# Patient Record
Sex: FEMALE | Race: WHITE | Employment: OTHER | URBAN - NONMETROPOLITAN AREA
[De-identification: names, ages, dates, MRNs, and addresses within clinical notes are randomized per-mention and may not be internally consistent; named-entity substitution may affect disease eponyms.]

---

## 2020-09-22 RX ORDER — POTASSIUM CHLORIDE 750 MG/1
10 CAPSULE, EXTENDED RELEASE ORAL 2 TIMES DAILY
COMMUNITY

## 2020-09-22 RX ORDER — DULOXETIN HYDROCHLORIDE 60 MG/1
60 CAPSULE, DELAYED RELEASE ORAL NIGHTLY
COMMUNITY

## 2020-09-22 RX ORDER — VALSARTAN 160 MG/1
160 TABLET ORAL DAILY
COMMUNITY

## 2020-09-22 RX ORDER — MULTIVIT WITH MINERALS/LUTEIN
1000 TABLET ORAL DAILY
COMMUNITY

## 2020-09-22 RX ORDER — LEVOTHYROXINE SODIUM 0.1 MG/1
100 TABLET ORAL DAILY
COMMUNITY

## 2020-09-22 RX ORDER — METOPROLOL TARTRATE 50 MG/1
50 TABLET, FILM COATED ORAL 2 TIMES DAILY
COMMUNITY

## 2020-09-22 RX ORDER — BUMETANIDE 0.5 MG/1
0.5 TABLET ORAL DAILY
COMMUNITY

## 2020-09-22 RX ORDER — ASCORBIC ACID 500 MG
1000 TABLET ORAL DAILY
COMMUNITY

## 2020-09-22 RX ORDER — SALSALATE 750 MG
750 TABLET ORAL 2 TIMES DAILY
COMMUNITY

## 2020-09-22 NOTE — PROGRESS NOTES
Patient instructed on the pre-operative, intra-operative, and post-operative process. Patient instructed on NPO status. Medication instructions and Pre operative instruction sheet reviewed over the phone. Instructed pt to stop taking all over the counter non prescription vitamins as of 9/26 and to take metoprolol and synthroid with a small sip of water prior to arriving to the hospital the day of surgery.

## 2020-09-23 ENCOUNTER — OFFICE VISIT (OUTPATIENT)
Dept: OBGYN CLINIC | Age: 70
End: 2020-09-23
Payer: COMMERCIAL

## 2020-09-23 VITALS
BODY MASS INDEX: 46.78 KG/M2 | SYSTOLIC BLOOD PRESSURE: 122 MMHG | HEIGHT: 63 IN | DIASTOLIC BLOOD PRESSURE: 70 MMHG | WEIGHT: 264 LBS

## 2020-09-23 PROCEDURE — 99213 OFFICE O/P EST LOW 20 MIN: CPT | Performed by: OBSTETRICS & GYNECOLOGY

## 2020-09-23 NOTE — PROGRESS NOTES
DATE OF VISIT:  9/23/20    PATIENT NAME:  Anahi Cartwright     YOB: 1950    REASON FOR VISIT:    Chief Complaint   Patient presents with    Pre-op Exam     Pt. here for f/u prior to surgery. Pt. bleeding postmenopausally. HISTORY OF PRESENT ILLNESS:  Pt with postmenopausal bleeding and thickened endometrium on usn; ut 7 cm with 12 mm lining      No LMP recorded. Patient is postmenopausal.  Vitals:    09/23/20 1620   Weight: 264 lb (119.7 kg)   Height: 5' 2.5\" (1.588 m)     Body mass index is 47.52 kg/m². Allergies   Allergen Reactions    Other      PNEUMONIA VACCINE    Sulfa Antibiotics Swelling     Current Outpatient Medications   Medication Sig Dispense Refill    valsartan (DIOVAN) 160 MG tablet Take 160 mg by mouth daily      metoprolol tartrate (LOPRESSOR) 50 MG tablet Take 50 mg by mouth 2 times daily      levothyroxine (SYNTHROID) 100 MCG tablet Take 100 mcg by mouth Daily      VITAMIN D PO Take by mouth      potassium chloride (MICRO-K) 10 MEQ extended release capsule Take 10 mEq by mouth 2 times daily      salsalate (DISALCID) 750 MG tablet Take 750 mg by mouth 2 times daily      bumetanide (BUMEX) 0.5 MG tablet Take 0.5 mg by mouth daily      DULoxetine (CYMBALTA) 60 MG extended release capsule Take 60 mg by mouth nightly      vitamin E 1000 units capsule Take 1,000 Units by mouth daily      vitamin C (ASCORBIC ACID) 500 MG tablet Take 1,000 mg by mouth daily      Multiple Vitamins-Minerals (MULTIVITAMIN ADULTS PO) Take by mouth daily      B Complex Vitamins (VITAMIN-B COMPLEX PO) Take by mouth daily      Zinc Sulfate (ZINC 15 PO) Take by mouth daily      Calcium Citrate-Vitamin D (CITRACAL + D PO) Take by mouth daily       No current facility-administered medications for this visit.       Social History     Socioeconomic History    Marital status:      Spouse name: Not on file    Number of children: Not on file    Years of education: Not on file    Highest education level: Not on file   Occupational History    Not on file   Social Needs    Financial resource strain: Not on file    Food insecurity     Worry: Not on file     Inability: Not on file    Transportation needs     Medical: Not on file     Non-medical: Not on file   Tobacco Use    Smoking status: Never Smoker    Smokeless tobacco: Never Used   Substance and Sexual Activity    Alcohol use: Not Currently    Drug use: Never    Sexual activity: Not on file   Lifestyle    Physical activity     Days per week: Not on file     Minutes per session: Not on file    Stress: Not on file   Relationships    Social connections     Talks on phone: Not on file     Gets together: Not on file     Attends [employer] service: Not on file     Active member of club or organization: Not on file     Attends meetings of clubs or organizations: Not on file     Relationship status: Not on file    Intimate partner violence     Fear of current or ex partner: Not on file     Emotionally abused: Not on file     Physically abused: Not on file     Forced sexual activity: Not on file   Other Topics Concern    Not on file   Social History Narrative    Not on file       REVIEW OF SYSTEMS:  Review of Systems   Constitutional: Negative for chills and fever. Genitourinary: Positive for vaginal bleeding. Negative for dysuria, pelvic pain and vaginal discharge. PHYSICAL EXAM:  Ht 5' 2.5\" (1.588 m)   Wt 264 lb (119.7 kg)   BMI 47.52 kg/m²   Physical Exam  Constitutional:       Appearance: Normal appearance. HENT:      Head: Normocephalic and atraumatic. Eyes:      Extraocular Movements: Extraocular movements intact. Pupils: Pupils are equal, round, and reactive to light. Cardiovascular:      Rate and Rhythm: Normal rate. Musculoskeletal: Normal range of motion. Neurological:      Mental Status: She is alert and oriented to person, place, and time. Skin:     General: Skin is warm.    Psychiatric: Mood and Affect: Mood normal.         Behavior: Behavior normal.         Thought Content: Thought content normal.         Judgment: Judgment normal.         ASSESSMENT:    1. Postmenopausal bleeding    2. Endometrial hyperplasia, unspecified        PLAN:  No orders of the defined types were placed in this encounter. R/B/A disc'd and consent obtained  Return in about 1 week (around 9/30/2020) for hysteroscopy d&c.        Electronically signed by Carina Gill DO on 09/23/20

## 2020-09-25 ENCOUNTER — HOSPITAL ENCOUNTER (OUTPATIENT)
Age: 70
Discharge: HOME | End: 2020-09-25
Payer: COMMERCIAL

## 2020-09-25 ENCOUNTER — HOSPITAL ENCOUNTER (OUTPATIENT)
Dept: PREADMISSION TESTING | Age: 70
Discharge: HOME | End: 2020-09-29
Payer: COMMERCIAL

## 2020-09-25 DIAGNOSIS — Z01.818 PRE-OP TESTING: Primary | ICD-10-CM

## 2020-09-25 LAB
ABSOLUTE EOS #: 0.14 K/UL (ref 0–0.44)
ABSOLUTE IMMATURE GRANULOCYTE: <0.03 K/UL (ref 0–0.3)
ABSOLUTE LYMPH #: 0.99 K/UL (ref 1.1–3.7)
ABSOLUTE MONO #: 0.39 K/UL (ref 0.1–1.2)
ANION GAP SERPL CALCULATED.3IONS-SCNC: 11 MMOL/L (ref 9–17)
BASOPHILS # BLD: 0 % (ref 0–2)
BASOPHILS ABSOLUTE: <0.03 K/UL (ref 0–0.2)
BUN BLDV-MCNC: 14 MG/DL (ref 8–23)
BUN/CREAT BLD: 19 (ref 9–20)
CALCIUM SERPL-MCNC: 9 MG/DL (ref 8.6–10.4)
CHLORIDE BLD-SCNC: 100 MMOL/L (ref 98–107)
CO2: 28 MMOL/L (ref 20–31)
CREAT SERPL-MCNC: 0.72 MG/DL (ref 0.5–0.9)
DIFFERENTIAL TYPE: ABNORMAL
EKG ATRIAL RATE: 77 BPM
EKG P AXIS: 74 DEGREES
EKG P-R INTERVAL: 172 MS
EKG Q-T INTERVAL: 384 MS
EKG QRS DURATION: 68 MS
EKG QTC CALCULATION (BAZETT): 434 MS
EKG R AXIS: 43 DEGREES
EKG T AXIS: 75 DEGREES
EKG VENTRICULAR RATE: 77 BPM
EOSINOPHILS RELATIVE PERCENT: 3 % (ref 1–4)
GFR AFRICAN AMERICAN: >60 ML/MIN
GFR NON-AFRICAN AMERICAN: >60 ML/MIN
GFR SERPL CREATININE-BSD FRML MDRD: ABNORMAL ML/MIN/{1.73_M2}
GFR SERPL CREATININE-BSD FRML MDRD: ABNORMAL ML/MIN/{1.73_M2}
GLUCOSE BLD-MCNC: 114 MG/DL (ref 70–99)
HCT VFR BLD CALC: 44.3 % (ref 36.3–47.1)
HEMOGLOBIN: 13.8 G/DL (ref 11.9–15.1)
IMMATURE GRANULOCYTES: 0 %
LYMPHOCYTES # BLD: 19 % (ref 24–43)
MCH RBC QN AUTO: 30.1 PG (ref 25.2–33.5)
MCHC RBC AUTO-ENTMCNC: 31.2 G/DL (ref 28.4–34.8)
MCV RBC AUTO: 96.5 FL (ref 82.6–102.9)
MONOCYTES # BLD: 7 % (ref 3–12)
NRBC AUTOMATED: 0 PER 100 WBC
PDW BLD-RTO: 13.9 % (ref 11.8–14.4)
PLATELET # BLD: 174 K/UL (ref 138–453)
PLATELET ESTIMATE: ABNORMAL
PMV BLD AUTO: 9.5 FL (ref 8.1–13.5)
POTASSIUM SERPL-SCNC: 4 MMOL/L (ref 3.7–5.3)
RBC # BLD: 4.59 M/UL (ref 3.95–5.11)
RBC # BLD: ABNORMAL 10*6/UL
SEG NEUTROPHILS: 71 % (ref 36–65)
SEGMENTED NEUTROPHILS ABSOLUTE COUNT: 3.72 K/UL (ref 1.5–8.1)
SODIUM BLD-SCNC: 139 MMOL/L (ref 135–144)
WBC # BLD: 5.3 K/UL (ref 3.5–11.3)
WBC # BLD: ABNORMAL 10*3/UL

## 2020-09-25 PROCEDURE — C9803 HOPD COVID-19 SPEC COLLECT: HCPCS

## 2020-09-25 PROCEDURE — 93010 ELECTROCARDIOGRAM REPORT: CPT | Performed by: INTERNAL MEDICINE

## 2020-09-25 PROCEDURE — 93005 ELECTROCARDIOGRAM TRACING: CPT

## 2020-09-25 PROCEDURE — U0003 INFECTIOUS AGENT DETECTION BY NUCLEIC ACID (DNA OR RNA); SEVERE ACUTE RESPIRATORY SYNDROME CORONAVIRUS 2 (SARS-COV-2) (CORONAVIRUS DISEASE [COVID-19]), AMPLIFIED PROBE TECHNIQUE, MAKING USE OF HIGH THROUGHPUT TECHNOLOGIES AS DESCRIBED BY CMS-2020-01-R: HCPCS

## 2020-09-25 PROCEDURE — 36415 COLL VENOUS BLD VENIPUNCTURE: CPT

## 2020-09-25 PROCEDURE — 85025 COMPLETE CBC W/AUTO DIFF WBC: CPT

## 2020-09-25 PROCEDURE — 80048 BASIC METABOLIC PNL TOTAL CA: CPT

## 2020-09-27 LAB — SARS-COV-2, NAA: NOT DETECTED

## 2020-09-28 ENCOUNTER — TELEPHONE (OUTPATIENT)
Dept: PRIMARY CARE CLINIC | Age: 70
End: 2020-09-28

## 2020-10-01 ENCOUNTER — ANESTHESIA EVENT (OUTPATIENT)
Dept: OPERATING ROOM | Age: 70
End: 2020-10-01
Payer: COMMERCIAL

## 2020-10-02 ENCOUNTER — HOSPITAL ENCOUNTER (OUTPATIENT)
Age: 70
Setting detail: OUTPATIENT SURGERY
Discharge: HOME | End: 2020-10-02
Attending: OBSTETRICS & GYNECOLOGY | Admitting: OBSTETRICS & GYNECOLOGY
Payer: COMMERCIAL

## 2020-10-02 ENCOUNTER — ANESTHESIA (OUTPATIENT)
Dept: OPERATING ROOM | Age: 70
End: 2020-10-02
Payer: COMMERCIAL

## 2020-10-02 VITALS
SYSTOLIC BLOOD PRESSURE: 137 MMHG | TEMPERATURE: 97.2 F | HEIGHT: 62 IN | WEIGHT: 250 LBS | DIASTOLIC BLOOD PRESSURE: 59 MMHG | HEART RATE: 87 BPM | BODY MASS INDEX: 46.01 KG/M2 | RESPIRATION RATE: 16 BRPM | OXYGEN SATURATION: 97 %

## 2020-10-02 VITALS — DIASTOLIC BLOOD PRESSURE: 72 MMHG | SYSTOLIC BLOOD PRESSURE: 130 MMHG | OXYGEN SATURATION: 97 %

## 2020-10-02 PROCEDURE — 2709999900 HC NON-CHARGEABLE SUPPLY: Performed by: OBSTETRICS & GYNECOLOGY

## 2020-10-02 PROCEDURE — 6360000002 HC RX W HCPCS: Performed by: NURSE ANESTHETIST, CERTIFIED REGISTERED

## 2020-10-02 PROCEDURE — 88360 TUMOR IMMUNOHISTOCHEM/MANUAL: CPT

## 2020-10-02 PROCEDURE — 7100000010 HC PHASE II RECOVERY - FIRST 15 MIN: Performed by: OBSTETRICS & GYNECOLOGY

## 2020-10-02 PROCEDURE — 88342 IMHCHEM/IMCYTCHM 1ST ANTB: CPT

## 2020-10-02 PROCEDURE — 88305 TISSUE EXAM BY PATHOLOGIST: CPT

## 2020-10-02 PROCEDURE — 3700000001 HC ADD 15 MINUTES (ANESTHESIA): Performed by: OBSTETRICS & GYNECOLOGY

## 2020-10-02 PROCEDURE — 58558 HYSTEROSCOPY BIOPSY: CPT | Performed by: OBSTETRICS & GYNECOLOGY

## 2020-10-02 PROCEDURE — 2580000003 HC RX 258: Performed by: OBSTETRICS & GYNECOLOGY

## 2020-10-02 PROCEDURE — 3700000000 HC ANESTHESIA ATTENDED CARE: Performed by: OBSTETRICS & GYNECOLOGY

## 2020-10-02 PROCEDURE — 3600000013 HC SURGERY LEVEL 3 ADDTL 15MIN: Performed by: OBSTETRICS & GYNECOLOGY

## 2020-10-02 PROCEDURE — 6370000000 HC RX 637 (ALT 250 FOR IP): Performed by: OBSTETRICS & GYNECOLOGY

## 2020-10-02 PROCEDURE — 2500000003 HC RX 250 WO HCPCS: Performed by: NURSE ANESTHETIST, CERTIFIED REGISTERED

## 2020-10-02 PROCEDURE — 88341 IMHCHEM/IMCYTCHM EA ADD ANTB: CPT

## 2020-10-02 PROCEDURE — 3600000003 HC SURGERY LEVEL 3 BASE: Performed by: OBSTETRICS & GYNECOLOGY

## 2020-10-02 PROCEDURE — 7100000011 HC PHASE II RECOVERY - ADDTL 15 MIN: Performed by: OBSTETRICS & GYNECOLOGY

## 2020-10-02 RX ORDER — HYDROCODONE BITARTRATE AND ACETAMINOPHEN 5; 325 MG/1; MG/1
2 TABLET ORAL PRN
Status: DISCONTINUED | OUTPATIENT
Start: 2020-10-02 | End: 2020-10-02 | Stop reason: HOSPADM

## 2020-10-02 RX ORDER — KETOROLAC TROMETHAMINE 30 MG/ML
INJECTION, SOLUTION INTRAMUSCULAR; INTRAVENOUS PRN
Status: DISCONTINUED | OUTPATIENT
Start: 2020-10-02 | End: 2020-10-02 | Stop reason: SDUPTHER

## 2020-10-02 RX ORDER — ONDANSETRON 2 MG/ML
4 INJECTION INTRAMUSCULAR; INTRAVENOUS
Status: DISCONTINUED | OUTPATIENT
Start: 2020-10-02 | End: 2020-10-02 | Stop reason: HOSPADM

## 2020-10-02 RX ORDER — PROPOFOL 10 MG/ML
INJECTION, EMULSION INTRAVENOUS PRN
Status: DISCONTINUED | OUTPATIENT
Start: 2020-10-02 | End: 2020-10-02 | Stop reason: SDUPTHER

## 2020-10-02 RX ORDER — ONDANSETRON 4 MG/1
4 TABLET, ORALLY DISINTEGRATING ORAL EVERY 30 MIN PRN
Status: DISCONTINUED | OUTPATIENT
Start: 2020-10-02 | End: 2020-10-02 | Stop reason: HOSPADM

## 2020-10-02 RX ORDER — IBUPROFEN 600 MG/1
600 TABLET ORAL EVERY 6 HOURS PRN
Qty: 10 TABLET | Refills: 0 | Status: SHIPPED | OUTPATIENT
Start: 2020-10-02

## 2020-10-02 RX ORDER — PROPOFOL 10 MG/ML
INJECTION, EMULSION INTRAVENOUS CONTINUOUS PRN
Status: DISCONTINUED | OUTPATIENT
Start: 2020-10-02 | End: 2020-10-02 | Stop reason: SDUPTHER

## 2020-10-02 RX ORDER — ONDANSETRON 2 MG/ML
INJECTION INTRAMUSCULAR; INTRAVENOUS PRN
Status: DISCONTINUED | OUTPATIENT
Start: 2020-10-02 | End: 2020-10-02 | Stop reason: SDUPTHER

## 2020-10-02 RX ORDER — HYDROCODONE BITARTRATE AND ACETAMINOPHEN 5; 325 MG/1; MG/1
1 TABLET ORAL PRN
Status: DISCONTINUED | OUTPATIENT
Start: 2020-10-02 | End: 2020-10-02 | Stop reason: HOSPADM

## 2020-10-02 RX ORDER — KETAMINE HYDROCHLORIDE 100 MG/ML
INJECTION, SOLUTION INTRAMUSCULAR; INTRAVENOUS PRN
Status: DISCONTINUED | OUTPATIENT
Start: 2020-10-02 | End: 2020-10-02 | Stop reason: SDUPTHER

## 2020-10-02 RX ORDER — FENTANYL CITRATE 50 UG/ML
25 INJECTION, SOLUTION INTRAMUSCULAR; INTRAVENOUS EVERY 5 MIN PRN
Status: DISCONTINUED | OUTPATIENT
Start: 2020-10-02 | End: 2020-10-02 | Stop reason: HOSPADM

## 2020-10-02 RX ORDER — METOCLOPRAMIDE HYDROCHLORIDE 5 MG/ML
10 INJECTION INTRAMUSCULAR; INTRAVENOUS
Status: DISCONTINUED | OUTPATIENT
Start: 2020-10-02 | End: 2020-10-02 | Stop reason: HOSPADM

## 2020-10-02 RX ORDER — SODIUM CHLORIDE 0.9 % (FLUSH) 0.9 %
10 SYRINGE (ML) INJECTION EVERY 12 HOURS SCHEDULED
Status: DISCONTINUED | OUTPATIENT
Start: 2020-10-02 | End: 2020-10-02 | Stop reason: HOSPADM

## 2020-10-02 RX ORDER — SODIUM CHLORIDE 0.9 % (FLUSH) 0.9 %
10 SYRINGE (ML) INJECTION PRN
Status: DISCONTINUED | OUTPATIENT
Start: 2020-10-02 | End: 2020-10-02 | Stop reason: HOSPADM

## 2020-10-02 RX ORDER — DIMENHYDRINATE 50 MG/1
50 TABLET ORAL ONCE
Status: COMPLETED | OUTPATIENT
Start: 2020-10-02 | End: 2020-10-02

## 2020-10-02 RX ORDER — SODIUM CHLORIDE, SODIUM LACTATE, POTASSIUM CHLORIDE, CALCIUM CHLORIDE 600; 310; 30; 20 MG/100ML; MG/100ML; MG/100ML; MG/100ML
INJECTION, SOLUTION INTRAVENOUS CONTINUOUS
Status: DISCONTINUED | OUTPATIENT
Start: 2020-10-02 | End: 2020-10-02 | Stop reason: HOSPADM

## 2020-10-02 RX ORDER — ACETAMINOPHEN 325 MG/1
650 TABLET ORAL ONCE
Status: COMPLETED | OUTPATIENT
Start: 2020-10-02 | End: 2020-10-02

## 2020-10-02 RX ADMIN — PROPOFOL 50 MG: 10 INJECTION, EMULSION INTRAVENOUS at 11:09

## 2020-10-02 RX ADMIN — PROPOFOL 150 MCG/KG/MIN: 10 INJECTION, EMULSION INTRAVENOUS at 11:09

## 2020-10-02 RX ADMIN — KETAMINE HYDROCHLORIDE 30 MG: 100 INJECTION INTRAMUSCULAR; INTRAVENOUS at 11:18

## 2020-10-02 RX ADMIN — ONDANSETRON 4 MG: 2 INJECTION INTRAMUSCULAR; INTRAVENOUS at 11:11

## 2020-10-02 RX ADMIN — DIMENHYDRINATE 50 MG: 50 TABLET ORAL at 10:27

## 2020-10-02 RX ADMIN — KETAMINE HYDROCHLORIDE 20 MG: 100 INJECTION INTRAMUSCULAR; INTRAVENOUS at 11:09

## 2020-10-02 RX ADMIN — ACETAMINOPHEN 650 MG: 325 TABLET, FILM COATED ORAL at 10:27

## 2020-10-02 RX ADMIN — KETOROLAC TROMETHAMINE 30 MG: 30 INJECTION, SOLUTION INTRAMUSCULAR; INTRAVENOUS at 11:11

## 2020-10-02 RX ADMIN — SODIUM CHLORIDE, POTASSIUM CHLORIDE, SODIUM LACTATE AND CALCIUM CHLORIDE: 600; 310; 30; 20 INJECTION, SOLUTION INTRAVENOUS at 10:27

## 2020-10-02 ASSESSMENT — PAIN SCALES - GENERAL
PAINLEVEL_OUTOF10: 5
PAINLEVEL_OUTOF10: 0

## 2020-10-02 ASSESSMENT — LIFESTYLE VARIABLES: SMOKING_STATUS: 0

## 2020-10-02 ASSESSMENT — PAIN DESCRIPTION - LOCATION: LOCATION: VAGINA

## 2020-10-02 ASSESSMENT — PAIN DESCRIPTION - PAIN TYPE: TYPE: SURGICAL PAIN

## 2020-10-02 ASSESSMENT — PAIN DESCRIPTION - DESCRIPTORS: DESCRIPTORS: BURNING;ACHING

## 2020-10-02 ASSESSMENT — PAIN - FUNCTIONAL ASSESSMENT: PAIN_FUNCTIONAL_ASSESSMENT: 0-10

## 2020-10-02 ASSESSMENT — PAIN DESCRIPTION - FREQUENCY: FREQUENCY: INTERMITTENT

## 2020-10-02 NOTE — PROGRESS NOTES

## 2020-10-02 NOTE — ANESTHESIA POSTPROCEDURE EVALUATION
Department of Anesthesiology  Postprocedure Note    Patient: Humberto Tovar  MRN: [id number]  YOB: 1950  Date of evaluation: 10/2/2020  Time:  12:18 PM     Procedure Summary     Date:  10/02/20 Room / Location:  [address] [place] [address]    Anesthesia Start:  1100 Anesthesia Stop:  1140    Procedure:  DILATATION AND CURETTAGE HYSTEROSCOPY (N/A ) Diagnosis:  (POSTMENOPAUSAL BLEEDING, ENDOMETRIAL HYPERPLASIA)    Surgeon:  Clay Montejo DO Responsible Provider:  VETO Grayson CRNA    Anesthesia Type:  general ASA Status:  4          Anesthesia Type: general    Elizabeth Phase I: Elizabeth Score: 10    Elizabeth Phase II: Elizabeth Score: 10    Last vitals: Reviewed and per EMR flowsheets.        Anesthesia Post Evaluation    Patient location during evaluation: bedside  Patient participation: complete - patient participated  Level of consciousness: awake and alert  Pain score: 4  Airway patency: patent  Nausea & Vomiting: no nausea and no vomiting  Complications: no  Cardiovascular status: hemodynamically stable  Respiratory status: acceptable  Hydration status: stable

## 2020-10-02 NOTE — ANESTHESIA PRE PROCEDURE
Department of Anesthesiology  Preprocedure Note       Name:  Taylor Prasad   Age:  68 y.o.  :  1950                                          MRN:  [id number]         Date:  10/2/2020      Surgeon: Shana Grant):  Jenn Hair DO    Procedure: Procedure(s):  DILATATION AND CURETTAGE HYSTEROSCOPY    Medications prior to admission:   Prior to Admission medications    Medication Sig Start Date End Date Taking?  Authorizing Provider   valsartan (DIOVAN) 160 MG tablet Take 160 mg by mouth daily   Yes Historical Provider, MD   metoprolol tartrate (LOPRESSOR) 50 MG tablet Take 50 mg by mouth 2 times daily   Yes Historical Provider, MD   levothyroxine (SYNTHROID) 100 MCG tablet Take 100 mcg by mouth Daily   Yes Historical Provider, MD   VITAMIN D PO Take by mouth   Yes Historical Provider, MD   potassium chloride (MICRO-K) 10 MEQ extended release capsule Take 10 mEq by mouth 2 times daily   Yes Historical Provider, MD   salsalate (DISALCID) 750 MG tablet Take 750 mg by mouth 2 times daily   Yes Historical Provider, MD   bumetanide (BUMEX) 0.5 MG tablet Take 0.5 mg by mouth daily   Yes Historical Provider, MD   DULoxetine (CYMBALTA) 60 MG extended release capsule Take 60 mg by mouth nightly   Yes Historical Provider, MD   vitamin E 1000 units capsule Take 1,000 Units by mouth daily   Yes Historical Provider, MD   vitamin C (ASCORBIC ACID) 500 MG tablet Take 1,000 mg by mouth daily   Yes Historical Provider, MD   Multiple Vitamins-Minerals (MULTIVITAMIN ADULTS PO) Take by mouth daily   Yes Historical Provider, MD   B Complex Vitamins (VITAMIN-B COMPLEX PO) Take by mouth daily   Yes Historical Provider, MD   Zinc Sulfate (ZINC 15 PO) Take by mouth daily   Yes Historical Provider, MD   Calcium Citrate-Vitamin D (CITRACAL + D PO) Take by mouth daily   Yes Historical Provider, MD       Current medications:    Current Facility-Administered Medications   Medication Dose Route Frequency Provider Last Rate Last Dose    lactated ringers infusion   Intravenous Continuous Elier Shorts,  mL/hr at 10/02/20 1027      sodium chloride flush 0.9 % injection 10 mL  10 mL Intravenous 2 times per day Lyndsay Roach DO        sodium chloride flush 0.9 % injection 10 mL  10 mL Intravenous PRN Lyndsay Roach DO        ceFAZolin (ANCEF) 2 g in dextrose 5 % 100 mL IVPB  2 g Intravenous Once Elier Shorts, DO           Allergies: Allergies   Allergen Reactions    Other      PNEUMONIA VACCINE    Sulfa Antibiotics Swelling       Problem List:  There is no problem list on file for this patient. Past Medical History:        Diagnosis Date    Arthritis     Chronic pain     Fibromyalgia     Hx of blood clots     Hypertension     Thyroid disease        Past Surgical History:        Procedure Laterality Date    APPENDECTOMY      CARDIAC CATHETERIZATION      COLONOSCOPY  2020       Social History:    Social History     Tobacco Use    Smoking status: Never Smoker    Smokeless tobacco: Never Used   Substance Use Topics    Alcohol use: Not Currently                                Counseling given: Not Answered      Vital Signs (Current):   Vitals:    09/22/20 0935 10/02/20 1016   BP:  (!) 154/57   Pulse:  77   Resp:  16   Temp:  36.9 °C (98.4 °F)   TempSrc:  Temporal   SpO2:  99%   Weight: 250 lb (113.4 kg) 250 lb (113.4 kg)   Height: 5' 2\" (1.575 m) 5' 2\" (1.575 m)                                              BP Readings from Last 3 Encounters:   10/02/20 (!) 154/57   09/23/20 122/70       NPO Status: Time of last liquid consumption: 1800                        Time of last solid consumption: 1800                        Date of last liquid consumption: 10/01/20                        Date of last solid food consumption: 10/01/20    BMI:   Wt Readings from Last 3 Encounters:   10/02/20 250 lb (113.4 kg)   09/23/20 264 lb (119.7 kg)     Body mass index is 45.73 kg/m².     CBC:   Lab Results   Component Value Date    WBC 5.3 09/25/2020    RBC 4.59 09/25/2020    HGB 13.8 09/25/2020    HCT 44.3 09/25/2020    MCV 96.5 09/25/2020    RDW 13.9 09/25/2020     09/25/2020       CMP:   Lab Results   Component Value Date     09/25/2020    K 4.0 09/25/2020     09/25/2020    CO2 28 09/25/2020    BUN 14 09/25/2020    CREATININE 0.72 09/25/2020    GFRAA >60 09/25/2020    LABGLOM >60 09/25/2020    GLUCOSE 114 09/25/2020    CALCIUM 9.0 09/25/2020       POC Tests: No results for input(s): POCGLU, POCNA, POCK, POCCL, POCBUN, POCHEMO, POCHCT in the last 72 hours. Coags: No results found for: PROTIME, INR, APTT    HCG (If Applicable): No results found for: PREGTESTUR, PREGSERUM, HCG, HCGQUANT     ABGs: No results found for: PHART, PO2ART, TNZ2AWR, JCG2JEQ, BEART, S9XTMDPC     Type & Screen (If Applicable):  No results found for: LABABO, LABRH    Drug/Infectious Status (If Applicable):  No results found for: HIV, HEPCAB    COVID-19 Screening (If Applicable):   Lab Results   Component Value Date    COVID19 Not Detected 09/25/2020         Anesthesia Evaluation   no history of anesthetic complications:   Airway: Mallampati: II  TM distance: <3 FB   Neck ROM: full  Mouth opening: > = 3 FB Dental:          Pulmonary:normal exam    (+) decreased breath sounds,      (-) recent URI and not a current smoker                           Cardiovascular:  Exercise tolerance: good (>4 METS),   (+) hypertension: moderate,                   Neuro/Psych:   (+) neuromuscular disease (Fibromyalgia, baseline 5/10 in lower back at rest. ):,             GI/Hepatic/Renal:   (+) morbid obesity     (-) GERD       Endo/Other:    (+) hypothyroidism::., .                 Abdominal:   (+) obese,         Vascular:                                        Anesthesia Plan      general     ASA 4       Induction: intravenous. MIPS: Postoperative opioids intended and Prophylactic antiemetics administered.   Anesthetic plan and risks discussed with patient and spouse.                       Yanet España II, APRN - CRNA   10/2/2020

## 2020-10-02 NOTE — PROGRESS NOTES
Patient ambulates to bathroom; gait steady with walker; voids clear yellow urine; ana pad with scant pink drainage noted; ana-pad changed and patient returns to sit up in chair.

## 2020-10-02 NOTE — OP NOTE
Preoperative diagnosis: Postmenopausal bleeding   Thickened endometrium on ultrasound     Postoperative diagnosis: Same    Procedure: Hysteroscopy D&C    Surgeon: Dr. Tati Sandoval    Anesthesia: Gen. Estimated blood loss: 10 mL    Fluids: Lactated Ringer's    Condition: Stable    Complications: None    Specimen: Endometrial curettings    Findings: Patient had an approximately 7 cm uterus with a thickened endometrium was a small polypoid lesion noted on the right anterior aspect of the endometrial cavity. Procedure: The patient was taken to the operating room where she was prepped and draped usual sterile fashion. A weighted speculum was placed in the patient's vagina and the anterior lip of the cervix was grasped with a single-tooth tenaculum. The cervix was progressively dilated. The uterus was sounded and a hysteroscope was introduced with the findings noted above. The hysteroscope was then removed. A gentle sharp curettage was performed. The hysteroscope was reintroduced to assure adequate removal of the endometrial tissue. Once this was completed the hysteroscope was removed. The tenaculum was released with hemostasis being noted. The weighted speculum was removed. Sponge, lap, needle, and instrument counts were correct ×2. The patient was taken to the recovery area in apparently stable condition.

## 2020-10-02 NOTE — H&P
HISTORY AND PHYSICAL             Date: 10/2/2020        Patient Name: Ezra Gan     YOB: 1950      Age:  68 y.o. Chief Complaint      postmenopausal bleeding  History Obtained From   patient    History of Present Illness   Pt with thickened endometrium on     Past Medical History     Past Medical History:   Diagnosis Date    Arthritis     Chronic pain     Fibromyalgia     Hx of blood clots     Hypertension     Thyroid disease         Past Surgical History     Past Surgical History:   Procedure Laterality Date    APPENDECTOMY      CARDIAC CATHETERIZATION      COLONOSCOPY  2020        Medications Prior to Admission     Prior to Admission medications    Medication Sig Start Date End Date Taking?  Authorizing Provider   valsartan (DIOVAN) 160 MG tablet Take 160 mg by mouth daily   Yes Historical Provider, MD   metoprolol tartrate (LOPRESSOR) 50 MG tablet Take 50 mg by mouth 2 times daily   Yes Historical Provider, MD   levothyroxine (SYNTHROID) 100 MCG tablet Take 100 mcg by mouth Daily   Yes Historical Provider, MD   VITAMIN D PO Take by mouth   Yes Historical Provider, MD   potassium chloride (MICRO-K) 10 MEQ extended release capsule Take 10 mEq by mouth 2 times daily   Yes Historical Provider, MD   salsalate (DISALCID) 750 MG tablet Take 750 mg by mouth 2 times daily   Yes Historical Provider, MD   bumetanide (BUMEX) 0.5 MG tablet Take 0.5 mg by mouth daily   Yes Historical Provider, MD   DULoxetine (CYMBALTA) 60 MG extended release capsule Take 60 mg by mouth nightly   Yes Historical Provider, MD   vitamin E 1000 units capsule Take 1,000 Units by mouth daily   Yes Historical Provider, MD   vitamin C (ASCORBIC ACID) 500 MG tablet Take 1,000 mg by mouth daily   Yes Historical Provider, MD   Multiple Vitamins-Minerals (MULTIVITAMIN ADULTS PO) Take by mouth daily   Yes Historical Provider, MD   B Complex Vitamins (VITAMIN-B COMPLEX PO) Take by mouth daily   Yes Historical Provider, MD   Zinc Sulfate (ZINC 15 PO) Take by mouth daily   Yes Historical Provider, MD   Calcium Citrate-Vitamin D (CITRACAL + D PO) Take by mouth daily   Yes Historical Provider, MD        Allergies   Other and Sulfa antibiotics    Social History     Social History     Tobacco History     Smoking Status  Never Smoker    Smokeless Tobacco Use  Never Used          Alcohol History     Alcohol Use Status  Not Currently          Drug Use     Drug Use Status  Never          Sexual Activity     Sexually Active  Not Asked                Family History   History reviewed. No pertinent family history. Review of Systems   Review of Systems   Constitutional: Negative for chills and fever. Genitourinary: Positive for vaginal bleeding. Negative for dysuria and pelvic pain. Physical Exam   BP (!) 154/57   Pulse 77   Temp 98.4 °F (36.9 °C) (Temporal)   Resp 16   Ht 5' 2\" (1.575 m)   Wt 250 lb (113.4 kg)   SpO2 99%   Breastfeeding No   BMI 45.73 kg/m²     Physical Exam  Constitutional:       Appearance: Normal appearance. HENT:      Head: Normocephalic and atraumatic. Eyes:      Extraocular Movements: Extraocular movements intact. Pupils: Pupils are equal, round, and reactive to light. Neck:      Musculoskeletal: Normal range of motion. Cardiovascular:      Rate and Rhythm: Normal rate. Pulmonary:      Effort: Pulmonary effort is normal.   Skin:     General: Skin is warm and dry. Neurological:      Mental Status: She is alert and oriented to person, place, and time. Psychiatric:         Mood and Affect: Mood normal.         Behavior: Behavior normal.         Thought Content: Thought content normal.         Judgment: Judgment normal.         Labs    No results found for this or any previous visit (from the past 24 hour(s)). Imaging/Diagnostics Last 24 Hours   No results found. Assessment      Postmenopausal bleeding  Thickened endometrium    Plan   1.  Hysteroscopy d&c    Consultations Ordered:  None    Electronically signed by Elier Cabrera DO on 10/2/20 at 10:48 AM EDT

## 2020-10-08 LAB — SURGICAL PATHOLOGY REPORT: NORMAL

## 2020-10-12 NOTE — RESULT ENCOUNTER NOTE
Spoke to patient regarding her results and recommendations. She is given the option of a second opinion with [facility] or scheduling her hyst with Dr. Jer Simeon. Patient is interested in the second opinion and referral is faxed to Dr. Dre Ross. She is aware that someone from his office will call her to schedule. She is encouraged to keep her post-op on 10/15 to follow up. Patient verbalized understanding, no further questions/concerns voiced.

## 2020-10-13 ENCOUNTER — TELEPHONE (OUTPATIENT)
Dept: OBGYN CLINIC | Age: 70
End: 2020-10-13

## 2020-10-13 NOTE — TELEPHONE ENCOUNTER
Patient called to say that she was able to get scheduled at Dr. Sofy Muller office today in [facility] with Dr. Pawan Le. She will start the work-up and Dr. Indira Dominguez will still be the one to perform surgery. She has a post-op visit on 10/15 with us. Patient was questioning if she still needs to keep that appt since she is seeing them today. Told her that we will keep the visit right now, after her visit with [facility], if she feels like all of her questions are answered and she is comfortable with their plan of care, we can cancel her visit with us. Patient agreeable to the plan and verbalized understanding.

## 2021-07-27 ENCOUNTER — TELEPHONE (OUTPATIENT)
Dept: OBGYN CLINIC | Age: 71
End: 2021-07-27

## 2021-07-27 NOTE — TELEPHONE ENCOUNTER
Pt called b/c her insurance didn't pay for her EKG from her pre surg testing 9/25 stating that there was no pre-auth. I can see that pre-auth is in the chart and I explained that to the patient but she would like a return phone call. She doesn't know how to proceed from here. Could you give her a call please. I told her you wouldn't contact her until next week when back from [facility]. THANKS!

## 2021-09-01 NOTE — TELEPHONE ENCOUNTER
Spoke to [facility] at [id number] [facility] to see if she could look into this farther and she has also included her supervisor to further investigate.

## (undated) DEVICE — DRAPE SURG LITH HYSTSCP D AND C STD N FEN N REINF W FLD PCH

## (undated) DEVICE — GAUZE,SPONGE,4"X4",16PLY,XRAY,STRL,LF: Brand: MEDLINE

## (undated) DEVICE — PACK,LITHOTOMY: Brand: MEDLINE

## (undated) DEVICE — PREP PAD BNS: Brand: CONVERTORS

## (undated) DEVICE — GOWN,AURORA,NONREINFORCED,LARGE: Brand: MEDLINE

## (undated) DEVICE — PEN: MARKING STD 100/CS: Brand: MEDICAL ACTION INDUSTRIES

## (undated) DEVICE — CATHETER URETH 16FR L16IN RED RUB INTMIT ROB MOD BARDX

## (undated) DEVICE — GLOVE SURG SZ 65 L12IN FNGR THK87MIL WHT LTX FREE

## (undated) DEVICE — TOWEL,OR,DSP,ST,BLUE,STD,4/PK,20PK/CS: Brand: MEDLINE

## (undated) DEVICE — COVER LT HNDL BLU PLAS

## (undated) DEVICE — SOLUTION IV IRRIG POUR BRL 0.9% SODIUM CHL 2F7124

## (undated) DEVICE — Y-TYPE TUR/BLADDER IRRIGATION SET, REGULATING CLAMP

## (undated) DEVICE — PAD,ABDOMINAL,8"X10",ST,LF: Brand: MEDLINE

## (undated) DEVICE — Z DISCONTINUED BY MEDLINE USE 2711682 TRAY SKIN PREP DRY W/ PREM GLV

## (undated) DEVICE — PAD N ADH W3XL4IN POLY COT SFT PERF FLM EASILY CUT ABSRB

## (undated) DEVICE — TUBING, SUCTION, 9/32" X 12', STRAIGHT: Brand: MEDLINE INDUSTRIES, INC.

## (undated) DEVICE — SOLUTION SURG PREP ANTIMICROBIAL 4 OZ SKIN WND EXIDINE